# Patient Record
(demographics unavailable — no encounter records)

---

## 2025-03-03 NOTE — ASSESSMENT
[Diabetes Foot Care] : diabetes foot care [Long Term Vascular Complications] : long term vascular complications of diabetes [Carbohydrate Consistent Diet] : carbohydrate consistent diet [Importance of Diet and Exercise] : importance of diet and exercise to improve glycemic control, achieve weight loss and improve cardiovascular health [Exercise/Effect on Glucose] : exercise/effect on glucose [Hypoglycemia Management] : hypoglycemia management [Self Monitoring of Blood Glucose] : self monitoring of blood glucose [Retinopathy Screening] : Patient was referred to ophthalmology for retinopathy screening

## 2025-06-24 NOTE — PHYSICAL EXAM
[TextEntry] : General: In no acute distress. Head: Normocephalic Skin: Normal, no bruises. There are no cushingoid features Eyes: No pallor, lid lag or orbitopathy. Neck: No audible carotid bruit Thyroid: palpable, non-tender. No audible bruit. Not enlarged. + palpable nodules. Larimore's negative Lymph nodes: no palpable neck lymphadenopathy. Chest: Lungs clear to auscultation Heart: S1, S2, no murmurs Abdomen: No tenderness, no masses, + striae Neurological: Deep tendon reflexes symmetrical, 2+ (biceps, brachioradialis, patellar). Lower Extremities: + LLE edema, no cyanosis

## 2025-06-24 NOTE — ASSESSMENT
[Diabetes Foot Care] : diabetes foot care [Long Term Vascular Complications] : long term vascular complications of diabetes [Carbohydrate Consistent Diet] : carbohydrate consistent diet [Importance of Diet and Exercise] : importance of diet and exercise to improve glycemic control, achieve weight loss and improve cardiovascular health [Exercise/Effect on Glucose] : exercise/effect on glucose [Hypoglycemia Management] : hypoglycemia management [Self Monitoring of Blood Glucose] : self monitoring of blood glucose [Retinopathy Screening] : Patient was referred to ophthalmology for retinopathy screening [FreeTextEntry1] : 1. T2DM 2. HTN 3. Dyslipidemia 4. H/o subclinical hyperthyroidism vs NTI , recovered 5. MNG  - ag 3 - Xigduo XR 5/1000 mg 2 tabs PO daily  - Mounjaro 12.5 mg weekly for now. Monitor weight, might need to advance the dose next visit - crestor 40 mg, Zetia 10 mg daily. LDL goal < 55.  - bring the report of repeated carotid doppler - cardiology follow up and obtain the results of the stress test and echocardiogram - repeat Thyr US in 10/25 at [facility] - venous doppler  RTC 4-5 mo, labs prior.

## 2025-06-24 NOTE — HISTORY OF PRESENT ILLNESS
[FreeTextEntry1] : 66 yo male f/u for multiple medical issues  *** 2025 ***  doing well,  Xigduo XR 1000 mg 2 tabs PO daily,  Mounjaro 12.5 mg ,  Crestor 40 mg, Zetia 10 mg daily.  Name: Isrrael Mckinney YOB: 1957 Report Period: 2025 - 2025 (14 days) Generated: 2025 Time CGM Active: 80%  Glucose Statistics and Targets Average Glucose: 104 mg/dL Glucose Management Indicator (GMI): 5.8% Glucose Variability (%CV): 18.9% Target Range: 70 - 180 mg/dL  Time in Ranges Very High: >250 mg/dL --- 0% High: 181 - 250 mg/dL --- 1% Target Range: 70 - 180 mg/dL --- 98% Low: 54 - 69 mg/dL --- 1% Very Low: <54 mg/dL --- 0%  DXA(25)- LS (+2.6), LFN (+1.2), radius 33% (+1.7) Thyroid US (25)- RMP 1.1x0.9x0.5 solid hyper (TR2), RMP  solid hyper lobulatred with punctate echogenic foci (TR5)  *** Mar 03, 2025 ***  doing well, gained some weight over the holidays otherwise no new concerns seeing cardio later this month no sono/DXA yet staying on glumetza 2 g daily,  farxiga 10mg, Mounjaro 12.5 mg weekly,  Crestor 40 mg, Zetia 10 mg daily wearing Aashish Date of download:  2025  Diabetes Medications and Dosage: as above Indication for CGMS: verify a change in the treatment regimen, identify periods of hypoglycemia/ hyperglycemia.  Modal day report: pattern.  Pt with HYPO  0% of the time ( NONE below 54),  97% in target range Hyperglycemia:  3% elevation  Identified issues: carbohydrate counting dates analyzed:  2025- 2025   *** Aug 05, 2024 ***  feels well, offers no new c/o weight has been overall stable remains  on glumetza 2 g daily,  farxiga 10mg, Mounjaro 12.5 mg weekly,  Crestor 40 mg, Zetia 10 mg daily Date of download:  2024  Diabetes Medications and Dosage: as above Indication for CGMS: verify a change in the treatment regimen, identify periods of hypoglycemia/ hyperglycemia.  Modal day report: pattern.  Pt with HYPO  1% of the time ( NONE below 54),  98% in target range Hyperglycemia:  1% elevation  Identified issues: carbohydrate counting dates analyzed: 2024 - 2024  labs from 24 -A1c 5.8%, calcium-10.5 with albumin-4.7.  Corrected calcium is 9.9.  25-hydroxy vitamin D-51, LDL-31, total cholesterol-95, triglycerides-52.  *** 2024 ***  has been on steroids for 2 weeks b/o sinus infection feels well otherwise. lost more weight- total loss ~ 50 lbs staying on glumetza 2 g daily,  farxiga 10mg, Mounjaro 12.5 mg weekly,  Crestor 40 mg, Zetia 10 mg daily Labs from 2024)-A1c-6.0%, TSH-1.55, free T4- 1.1, LDL-30, calcium 10.2, testosterone 400, FSH/LH-10.3/5.2, prolactin 3.8 Date of download:  2024  Diabetes Medications and Dosage: as above Indication for CGMS: verify a change in the treatment regimen, identify periods of hypoglycemia/ hyperglycemia.  Modal day report: pattern.  Pt with HYPO  0% of the time ( NONE below 54),  100% in target range Hyperglycemia:  0% elevation  Identified issues: carbohydrate counting dates analyzed:  2024- 2024  *** 2024 ***  doing well overall, except for some fatigue staying on glumetza 2 g daily,  farxiga 10mg, Mounjaro 12.5 mg weekly,  Crestor 40 mg, Zetia 10 mg daily Labs from 1/3/2024-A1c 5.7%, creatinine 0.73, LDL 40 Date of download:  2024  Diabetes Medications and Dosage: as above Indication for CGMS: verify a change in the treatment regimen, identify periods of hypoglycemia/ hyperglycemia.  Modal day report: pattern.  Pt with HYPO  0% of the time ( NONE below 54),  100% in target range Hyperglycemia:  0% elevation  Identified issues: carbohydrate counting dates analyzed:  2023- 2024  *** Oct 03, 2023 ***  feels great, lost more weight. Total loss ~ 40 lbs no new c/o staying on glumetza 2g daily,  farxiga 10mg, Mounjaro 12.5 mg weekly,  Crestor 40 mg, Zetia 10 mg daily  wearing Aashish 3 Date of download:  10/03/2023  Diabetes Medications and Dosage: as above Indication for CGMS: verify a change in the treatment regimen, identify periods of hypoglycemia/ hyperglycemia.  Modal day report: pattern.  Pt with HYPO  0% of the time ( NONE below 54),  99% in target range Hyperglycemia:  1% elevation  Identified issues: carbohydrate counting dates analyzed:  2023- 10/03/2023  labs from 23- a1c- 5.7%  *** May 16, 2023 ***  feels great, less hip pain since losing weight; will be delaying his surgery. lost close to 30 lbs  doing PT, exercising staying on glumetza 2g daily,  farxiga 10mg, Mounjaro 12.5 mg weekly,  Crestor 40 mg, Zetia 10 mg daily  Date of download:  2023  Diabetes Medications and Dosage: as above Indication for CGMS: verify a change in the treatment regimen, identify periods of hypoglycemia/ hyperglycemia.  Modal day report: pattern.  Pt with HYPO  1% of the time ( NONE below 54),  97% in target range Hyperglycemia:  2% elevation  Identified issues: carbohydrate counting dates analyzed:  2023- 2023   *** 2023 ***  s/p right hip replacement. doing PT staying on glumetza 2g daily,  farxiga 10mg, Mounjaro 7.5 mg weekly,  Crestor 40 mg, Zetia 10 mg daily  Date of download:  2023  Diabetes Medications and Dosage: as above Indication for CGMS: verify a change in the treatment regimen, identify periods of hypoglycemia/ hyperglycemia.  Modal day report: pattern.  Pt with HYPO  0% of the time ( NONE below 54),  97% in target range Hyperglycemia:  3% elevation  Identified issues: carbohydrate counting dates analyzed: 2023 - 2023  *** Dec 05, 2022 ***  feels much better taking glumetza 2g daily,  farxiga 10mg, Mounjaro 5 mg weekly, crestor 40 mg, Zetia 10 mg daily wearing aashish 3 Date of download:  2022  Diabetes Medications and Dosage: as above Indication for CGMS: verify a change in the treatment regimen, identify periods of hypoglycemia/ hyperglycemia.  Modal day report: pattern.  Pt with HYPO  0% of the time ( NONE below 54),  95% in target range Hyperglycemia:  5% elevation  Identified issues: carbohydrate counting dates analyzed: 2022 - 2022  POC a1c- 7.4%  *** 2022 ***  had a CAC done last year- in 3000's. s/p cardiac cath with PTCI x 1 in  taking Onglyza 5mg, Metformin 1g bid, Farxiga 10mg, Crestor 40 mg HS, Zetia 10 mg qd never started insulin unable to exercise b/o hip- scheduled reports fbs- 120's- 170's (before changing his diet), ppg- not checking  Labs (11/3/2022)-A1c 7.8%, calcium 9.6, creatinine-0.77, fructosamine-279, TSH-1.67, free T4-1 0, 25D-54  Thyroid ultrasound (10/7/2022)-right midpole 0.6 x 0.5 x 0.5 cm cystic nodule, right isthmic 0.8 x 0.4 x 0.8 cm smooth isoechoic nodule (no calcifications), left midpole 0.4 x 0.3 x 0.4 cm smooth isoechoic nodule (decreased in size).  *** Televisit  May 14, 2020 ***  on onglyza 5mg, glumetza 1g bid, farxiga 10mg, crestor 20 mg HS, ramipril 5 mg, ASA 81mg never took insulin lost 10 lbs . better diet. exercising more, riding bike reports fbs- 120's- 130's, not checking ppg  Thyr US (3/15/20)- RMP 0.5x0.3x0.5 cystic with internal debris, LMP iso solid 0.6x0.5x0.6   HPI:  on onglyza 5mg, glumetza 1g bid, farxiga 10mg did not start amaryl  and trulicity- was able to lose weight with diet and exercise, but  much less active b/o spinal stenosis/sciatica.  took prednisone for 2 weeks which elevated blood sugar. now is off planned for spinal fusion on 12/10/18  no log; fbs- 130's- 140's,   ppg- 140's- 150's   saw renal (Dr. Lax)  2016 a1c- 7.1 <--  6.5 <-- 6.8 <--6.9 <-- 6.9 <-- 6.9 f/amine-  271 ca- 9.6 <-- 10.3 TSH- 1.83 <-- 1.89 f/amine - 271 <--270 <--286 <-- 274 <-- 324, TFT's - wnl, creat- 0.8 LDL- 79 <--90  Thyr US (3/26/16)- RMP hypo complex 0.6x0.5x0.6; RUP complex cystic 0.4x0.4x0.4; LMP heterogeneous 0.6x0.4x0.5. Overall stable sono  Thyr US (10/2/15)- RMP complex 0.8x0.5x0.7; RUP 0.5; LMP 0.7x0.5x0.6 heterogeneous.  prior Thyr US (11)- RMP 0.3 hypo complex  Thyr US (4/20/10) - RMP 0.3 hypo oval, poss debris-filled colloid cyst    HISTORY OF PRESENT ILLNESS.    Patient was diagnosed with Diabetes Mellitus Type 2 in .   Denies known complications of retinopathy, but recalls history of proteinuria and diabetic neuropathy.   Reports  history  HTN, dyslipidemia. Denies CAD.  Family history is positive for diabetes (aunt).    Presently on glumetza 1g 2 tabs a day, onglyza 5mg, ramipril 5mg, crestor 10mg, niaspan er 500mg bid  With recent kidneys tone and possibly pyelo infection (WBC - 20,000) Blood sugars are checked 2 times a day. Lo--209, not checking ppg. Hypoglycemia frequency: none.   Diet: non-adherent to ADA.  Exercise: walking.  Carotid doppler - ? 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 Arrieta); seeing another cardiologist   -  Last stress test in , planned for another stress test Last 2-D Echo in